# Patient Record
Sex: MALE | Race: OTHER | HISPANIC OR LATINO
[De-identification: names, ages, dates, MRNs, and addresses within clinical notes are randomized per-mention and may not be internally consistent; named-entity substitution may affect disease eponyms.]

---

## 2024-08-27 ENCOUNTER — APPOINTMENT (OUTPATIENT)
Dept: INTERNAL MEDICINE | Facility: CLINIC | Age: 44
End: 2024-08-27

## 2024-08-27 ENCOUNTER — OUTPATIENT (OUTPATIENT)
Dept: OUTPATIENT SERVICES | Facility: HOSPITAL | Age: 44
LOS: 1 days | End: 2024-08-27

## 2024-08-27 VITALS
BODY MASS INDEX: 29.81 KG/M2 | SYSTOLIC BLOOD PRESSURE: 121 MMHG | HEIGHT: 62 IN | OXYGEN SATURATION: 96 % | HEART RATE: 63 BPM | WEIGHT: 162 LBS | DIASTOLIC BLOOD PRESSURE: 80 MMHG

## 2024-08-27 DIAGNOSIS — Z78.9 OTHER SPECIFIED HEALTH STATUS: ICD-10-CM

## 2024-08-27 DIAGNOSIS — L40.0 PSORIASIS VULGARIS: ICD-10-CM

## 2024-08-27 DIAGNOSIS — Z00.00 ENCOUNTER FOR GENERAL ADULT MEDICAL EXAMINATION W/OUT ABNORMAL FINDINGS: ICD-10-CM

## 2024-08-27 PROCEDURE — 99386 PREV VISIT NEW AGE 40-64: CPT

## 2024-08-27 RX ORDER — HYDROCORTISONE 10 MG/G
1 CREAM TOPICAL
Qty: 30 | Refills: 0 | Status: ACTIVE | COMMUNITY
Start: 2024-08-27 | End: 1900-01-01

## 2024-08-27 RX ORDER — WHITE PETROLATUM 1.75 OZ
OINTMENT TOPICAL TWICE DAILY
Qty: 1 | Refills: 0 | Status: ACTIVE | COMMUNITY
Start: 2024-08-27 | End: 1900-01-01

## 2024-08-28 ENCOUNTER — NON-APPOINTMENT (OUTPATIENT)
Age: 44
End: 2024-08-28

## 2024-08-28 LAB
ALBUMIN SERPL ELPH-MCNC: 4.8 G/DL
ALP BLD-CCNC: 122 U/L
ALT SERPL-CCNC: 37 U/L
ANION GAP SERPL CALC-SCNC: 12 MMOL/L
AST SERPL-CCNC: 29 U/L
BASOPHILS # BLD AUTO: 0.04 K/UL
BASOPHILS NFR BLD AUTO: 0.4 %
BILIRUB SERPL-MCNC: 0.3 MG/DL
BUN SERPL-MCNC: 10 MG/DL
CALCIUM SERPL-MCNC: 9.2 MG/DL
CHLORIDE SERPL-SCNC: 104 MMOL/L
CHOLEST SERPL-MCNC: 179 MG/DL
CO2 SERPL-SCNC: 24 MMOL/L
CREAT SERPL-MCNC: 0.89 MG/DL
EGFR: 108 ML/MIN/1.73M2
EOSINOPHIL # BLD AUTO: 0.21 K/UL
EOSINOPHIL NFR BLD AUTO: 2.3 %
ESTIMATED AVERAGE GLUCOSE: 114 MG/DL
GLUCOSE SERPL-MCNC: 96 MG/DL
HBA1C MFR BLD HPLC: 5.6 %
HBV CORE IGM SER QL: NONREACTIVE
HBV SURFACE AB SER QL: NONREACTIVE
HBV SURFACE AG SER QL: NONREACTIVE
HCT VFR BLD CALC: 46.8 %
HDLC SERPL-MCNC: 33 MG/DL
HGB BLD-MCNC: 15.3 G/DL
IMM GRANULOCYTES NFR BLD AUTO: 0.6 %
LDLC SERPL CALC-MCNC: 105 MG/DL
LYMPHOCYTES # BLD AUTO: 2.5 K/UL
LYMPHOCYTES NFR BLD AUTO: 27.5 %
MAN DIFF?: NORMAL
MCHC RBC-ENTMCNC: 29.7 PG
MCHC RBC-ENTMCNC: 32.7 GM/DL
MCV RBC AUTO: 90.9 FL
MONOCYTES # BLD AUTO: 0.7 K/UL
MONOCYTES NFR BLD AUTO: 7.7 %
NEUTROPHILS # BLD AUTO: 5.58 K/UL
NEUTROPHILS NFR BLD AUTO: 61.5 %
NONHDLC SERPL-MCNC: 146 MG/DL
PLATELET # BLD AUTO: 284 K/UL
POTASSIUM SERPL-SCNC: 4.6 MMOL/L
PROT SERPL-MCNC: 8 G/DL
RBC # BLD: 5.15 M/UL
RBC # FLD: 13.1 %
SODIUM SERPL-SCNC: 140 MMOL/L
TRIGL SERPL-MCNC: 237 MG/DL
TSH SERPL-ACNC: 1.96 UIU/ML
WBC # FLD AUTO: 9.08 K/UL

## 2024-08-29 LAB
MEV IGG FLD QL IA: 39.9 AU/ML
MEV IGG+IGM SER-IMP: POSITIVE
MUV AB SER-ACNC: NEGATIVE
MUV IGG SER QL IA: <5 AU/ML
RUBV IGG FLD-ACNC: 1.11 INDEX
RUBV IGG SER-IMP: POSITIVE
VZV AB TITR SER: NEGATIVE
VZV IGG SER IF-ACNC: 89.89 INDEX

## 2024-09-03 PROBLEM — Z78.9 DOES NOT USE ILLICIT DRUGS: Status: ACTIVE | Noted: 2024-08-27

## 2024-09-03 PROBLEM — Z00.00 ENCOUNTER FOR PREVENTIVE HEALTH EXAMINATION: Status: ACTIVE | Noted: 2024-08-27

## 2024-09-03 PROBLEM — L40.0 PLAQUE PSORIASIS: Status: ACTIVE | Noted: 2024-08-27

## 2024-09-03 PROBLEM — Z78.9 EXERCISES OCCASIONALLY: Status: ACTIVE | Noted: 2024-08-27

## 2024-09-03 NOTE — REVIEW OF SYSTEMS
[Fatigue] : fatigue [Heartburn] : heartburn [Skin Rash] : skin rash [Negative] : Heme/Lymph [Fever] : no fever [Chills] : no chills [Recent Change In Weight] : ~T no recent weight change [FreeTextEntry7] : occasional  heartburn with spicy food [de-identified] : psoriatic plaques in bilateral UE and LE

## 2024-09-03 NOTE — END OF VISIT
[] : Resident [Time Spent: ___ minutes] : I have spent [unfilled] minutes of time on the encounter which excludes teaching and separately reported services. [FreeTextEntry3] : 45 yo M only PMH Plaque Psoriasis for which he used to follow with Derm for biologic injections (no longer), pres for CPE. Recent worsening of psoriasis and complaining of fatigue.   #Plaque Psoriasis-plan to refer to derm for reinitiation of injections, use aquaphor and hydrocortisone in meantime #Fatigue - attributed to increased work demands, will screen for anemia/electrolyte abnormalities/hypothyroid with cbc/cmp/tsh #HCM- plan as above, patient is relatively healthy and does not meet criteria for many screening/vaccines at this time, will also obtain lipids/a1c/vaccine titers as pt knows he is vaccinated but does not have records and is unsure of exact regimen.   RTC 1 year  Agree with above.

## 2024-09-03 NOTE — ASSESSMENT
[FreeTextEntry1] : #. HCM -colon cancer screening: N/A (no family history) -DEXA: N/A -HIV/HCV screening: decline at this visit -PSA screening: N/A -Vaccinations COVID: x2 TDAP: patient requesting to receive booster at next visit (unknown last booster) FLU: advised patient to receive at outside pharmacy when available  Pneumonia vaccine: N/A SHINGRIX: N/A  Lab work ordered: CBC, TSH (patient endorsing some fatigue - evaluate anemia/hypothyroidism), CMP, Lipid profile, A1c, MMRV titers, Hep B titers   43 y/o M PMH Plaque Psoriasis presenting for CPE with current psoriasis uncontrolled.   RTC in 1 year for CPE   Case Discussed with Dr. Mainor Barcenas PGY 3  Firm 3 [facility]

## 2024-09-03 NOTE — PHYSICAL EXAM
[Normal Sclera/Conjunctiva] : normal sclera/conjunctiva [PERRL] : pupils equal round and reactive to light [EOMI] : extraocular movements intact [Normal Outer Ear/Nose] : the outer ears and nose were normal in appearance [Normal Supraclavicular Nodes] : no supraclavicular lymphadenopathy [Normal Anterior Cervical Nodes] : no anterior cervical lymphadenopathy [Normal] : no joint swelling and grossly normal strength and tone [Normal Scalp] : inspection of the scalp showed no abnormalities [Examination Of The Hair] : texture and distribution of hair was normal [Skin Lesions 1] : Skin lesion: [Multiple] : multiple [Pink-[place]] : pink-salmon [Coordination Grossly Intact] : coordination grossly intact [Normal Gait] : normal gait [Speech Grossly Normal] : speech grossly normal [Memory Grossly Normal] : memory grossly normal [Normal Affect] : the affect was normal [Alert and Oriented x3] : oriented to person, place, and time [Normal Mood] : the mood was normal [Normal Insight/Judgement] : insight and judgment were intact [de-identified] : bilateral upper extremity and lower extremity flesh colored plaques with crusting, no open wounds or areas of fluctuance

## 2024-09-03 NOTE — ASSESSMENT
[FreeTextEntry1] : #. HCM -colon cancer screening: N/A (no family history) -DEXA: N/A -HIV/HCV screening: decline at this visit -PSA screening: N/A -Vaccinations COVID: x2 TDAP: patient requesting to receive booster at next visit (unknown last booster) FLU: advised patient to receive at outside pharmacy when available  Pneumonia vaccine: N/A SHINGRIX: N/A  Lab work ordered: CBC, TSH (patient endorsing some fatigue - evaluate anemia/hypothyroidism), CMP, Lipid profile, A1c, MMRV titers, Hep B titers   45 y/o M PMH Plaque Psoriasis presenting for CPE with current psoriasis uncontrolled.   RTC in 1 year for CPE   Case Discussed with Dr. Mainor Barcenas PGY 3  Firm 3 [facility]

## 2024-09-03 NOTE — REVIEW OF SYSTEMS
[Fatigue] : fatigue [Heartburn] : heartburn [Skin Rash] : skin rash [Negative] : Heme/Lymph [Fever] : no fever [Chills] : no chills [Recent Change In Weight] : ~T no recent weight change [FreeTextEntry7] : occasional  heartburn with spicy food [de-identified] : psoriatic plaques in bilateral UE and LE

## 2024-09-03 NOTE — HISTORY OF PRESENT ILLNESS
[FreeTextEntry1] : Plaque Psoriasis flare  [de-identified] : 45 y/o M PMH Plaque Psoriasis (previously on biologic therapy - last dose 1 year ago) presenting for CPE. Patient reporting worsening of his plaque psoriasis since he has not been to dermatologist in 1 year. Patient also endorsing he feels more tired than usual, and attributes this to increased work hours.

## 2024-09-03 NOTE — PHYSICAL EXAM
[Normal Sclera/Conjunctiva] : normal sclera/conjunctiva [PERRL] : pupils equal round and reactive to light [EOMI] : extraocular movements intact [Normal Outer Ear/Nose] : the outer ears and nose were normal in appearance [Normal Supraclavicular Nodes] : no supraclavicular lymphadenopathy [Normal Anterior Cervical Nodes] : no anterior cervical lymphadenopathy [Normal] : no joint swelling and grossly normal strength and tone [Normal Scalp] : inspection of the scalp showed no abnormalities [Examination Of The Hair] : texture and distribution of hair was normal [Skin Lesions 1] : Skin lesion: [Multiple] : multiple [[place]] : pink-salmon [Coordination Grossly Intact] : coordination grossly intact [Normal Gait] : normal gait [Speech Grossly Normal] : speech grossly normal [Memory Grossly Normal] : memory grossly normal [Normal Affect] : the affect was normal [Alert and Oriented x3] : oriented to person, place, and time [Normal Mood] : the mood was normal [Normal Insight/Judgement] : insight and judgment were intact [de-identified] : bilateral upper extremity and lower extremity flesh colored plaques with crusting, no open wounds or areas of fluctuance

## 2024-09-03 NOTE — REVIEW OF SYSTEMS
[Fatigue] : fatigue [Heartburn] : heartburn [Skin Rash] : skin rash [Negative] : Heme/Lymph [Fever] : no fever [Chills] : no chills [Recent Change In Weight] : ~T no recent weight change [FreeTextEntry7] : occasional  heartburn with spicy food [de-identified] : psoriatic plaques in bilateral UE and LE

## 2024-09-03 NOTE — PHYSICAL EXAM
[Normal Sclera/Conjunctiva] : normal sclera/conjunctiva [PERRL] : pupils equal round and reactive to light [EOMI] : extraocular movements intact [Normal Outer Ear/Nose] : the outer ears and nose were normal in appearance [Normal Supraclavicular Nodes] : no supraclavicular lymphadenopathy [Normal Anterior Cervical Nodes] : no anterior cervical lymphadenopathy [Normal] : no joint swelling and grossly normal strength and tone [Normal Scalp] : inspection of the scalp showed no abnormalities [Examination Of The Hair] : texture and distribution of hair was normal [Skin Lesions 1] : Skin lesion: [Multiple] : multiple [[place]ink[place]] : pink-salmon [Coordination Grossly Intact] : coordination grossly intact [Normal Gait] : normal gait [Speech Grossly Normal] : speech grossly normal [Memory Grossly Normal] : memory grossly normal [Normal Affect] : the affect was normal [Alert and Oriented x3] : oriented to person, place, and time [Normal Mood] : the mood was normal [Normal Insight/Judgement] : insight and judgment were intact [de-identified] : bilateral upper extremity and lower extremity flesh colored plaques with crusting, no open wounds or areas of fluctuance

## 2024-09-03 NOTE — HISTORY OF PRESENT ILLNESS
[FreeTextEntry1] : Plaque Psoriasis flare  [de-identified] : 45 y/o M PMH Plaque Psoriasis (previously on biologic therapy - last dose 1 year ago) presenting for CPE. Patient reporting worsening of his plaque psoriasis since he has not been to dermatologist in 1 year. Patient also endorsing he feels more tired than usual, and attributes this to increased work hours.

## 2024-09-03 NOTE — HISTORY OF PRESENT ILLNESS
[FreeTextEntry1] : Plaque Psoriasis flare  [de-identified] : 45 y/o M PMH Plaque Psoriasis (previously on biologic therapy - last dose 1 year ago) presenting for CPE. Patient reporting worsening of his plaque psoriasis since he has not been to dermatologist in 1 year. Patient also endorsing he feels more tired than usual, and attributes this to increased work hours.

## 2024-09-03 NOTE — END OF VISIT
[] : Resident [Time Spent: ___ minutes] : I have spent [unfilled] minutes of time on the encounter which excludes teaching and separately reported services. [FreeTextEntry3] : 43 yo M only PMH Plaque Psoriasis for which he used to follow with Derm for biologic injections (no longer), pres for CPE. Recent worsening of psoriasis and complaining of fatigue.   #Plaque Psoriasis-plan to refer to derm for reinitiation of injections, use aquaphor and hydrocortisone in meantime #Fatigue - attributed to increased work demands, will screen for anemia/electrolyte abnormalities/hypothyroid with cbc/cmp/tsh #HCM- plan as above, patient is relatively healthy and does not meet criteria for many screening/vaccines at this time, will also obtain lipids/a1c/vaccine titers as pt knows he is vaccinated but does not have records and is unsure of exact regimen.   RTC 1 year  Agree with above.

## 2024-09-05 DIAGNOSIS — Z00.00 ENCOUNTER FOR GENERAL ADULT MEDICAL EXAMINATION WITHOUT ABNORMAL FINDINGS: ICD-10-CM

## 2024-09-05 DIAGNOSIS — L40.0 PSORIASIS VULGARIS: ICD-10-CM

## 2024-12-06 ENCOUNTER — APPOINTMENT (OUTPATIENT)
Dept: DERMATOLOGY | Facility: CLINIC | Age: 44
End: 2024-12-06